# Patient Record
Sex: OTHER/UNKNOWN | URBAN - METROPOLITAN AREA
[De-identification: names, ages, dates, MRNs, and addresses within clinical notes are randomized per-mention and may not be internally consistent; named-entity substitution may affect disease eponyms.]

---

## 2024-08-28 ENCOUNTER — APPOINTMENT (OUTPATIENT)
Dept: URBAN - METROPOLITAN AREA CLINIC 214 | Age: 34
Setting detail: DERMATOLOGY
End: 2024-08-29

## 2024-08-28 PROBLEM — D23.72 OTHER BENIGN NEOPLASM OF SKIN OF LEFT LOWER LIMB, INCLUDING HIP: Status: ACTIVE | Noted: 2024-08-28

## 2024-08-28 PROCEDURE — 99202 OFFICE O/P NEW SF 15 MIN: CPT

## 2024-08-28 PROCEDURE — OTHER MIPS QUALITY: OTHER

## 2024-08-28 PROCEDURE — OTHER OBSERVATION: OTHER

## 2024-08-28 PROCEDURE — OTHER OBSERVATION AND MEASURE: OTHER

## 2024-08-28 PROCEDURE — OTHER DEFER: OTHER

## 2024-08-28 PROCEDURE — OTHER COUNSELING: OTHER

## 2024-08-28 NOTE — PROCEDURE: DEFER
Instructions (Optional): Patient will call and schedule once she has time.
Introduction Text (Please End With A Colon): The following procedure was deferred:
X Size Of Lesion In Cm (Optional): 0
Detail Level: Detailed
Size Of Lesion In Cm (Optional): 0.5